# Patient Record
Sex: FEMALE | HISPANIC OR LATINO | Employment: UNEMPLOYED | URBAN - METROPOLITAN AREA
[De-identification: names, ages, dates, MRNs, and addresses within clinical notes are randomized per-mention and may not be internally consistent; named-entity substitution may affect disease eponyms.]

---

## 2021-05-28 LAB
EXTERNAL ABO GROUPING: NORMAL
EXTERNAL HEPATITIS B SURFACE ANTIGEN: NEGATIVE
EXTERNAL HEPATITIS C, RNA QUANT PCR: NORMAL
EXTERNAL RH FACTOR: POSITIVE
EXTERNAL RUBELLA QUALITATIVE: NORMAL
EXTERNAL SYPHILIS RPR SCREEN: NORMAL
HIV1 P24 AG SERPL QL IA: NORMAL

## 2021-12-23 LAB — EXTERNAL GROUP B STREP ANTIGEN: NORMAL

## 2022-01-01 ENCOUNTER — HOSPITAL ENCOUNTER (EMERGENCY)
Facility: HOSPITAL | Age: 32
Discharge: HOME | End: 2022-01-01
Attending: OBSTETRICS & GYNECOLOGY | Admitting: OBSTETRICS & GYNECOLOGY

## 2022-01-01 VITALS
HEART RATE: 82 BPM | OXYGEN SATURATION: 99 % | HEIGHT: 61 IN | TEMPERATURE: 98 F | RESPIRATION RATE: 16 BRPM | DIASTOLIC BLOOD PRESSURE: 80 MMHG | SYSTOLIC BLOOD PRESSURE: 121 MMHG

## 2022-01-01 PROCEDURE — 99283 EMERGENCY DEPT VISIT LOW MDM: CPT | Performed by: OBSTETRICS & GYNECOLOGY

## 2022-01-01 PROCEDURE — 59025 FETAL NON-STRESS TEST: CPT

## 2022-01-01 RX ORDER — FERROUS SULFATE 325(65) MG
325 TABLET ORAL
COMMUNITY

## 2022-01-01 RX ORDER — FOLIC ACID 1 MG/1
1 TABLET ORAL DAILY
COMMUNITY
End: 2022-01-10 | Stop reason: HOSPADM

## 2022-01-01 RX ORDER — PRENATAL VIT NO.126/IRON/FOLIC 28MG-0.8MG
1 TABLET ORAL DAILY
COMMUNITY

## 2022-01-01 NOTE — NURSING NOTE
D/C papers given and explained. To call MD with any vag bleeding, LOF, decr FM or ctx. To keep next appt. Pt verbalized understanding. Ambulatory out the door. Plans on scheduled rpt cs next weekend.  Ferny Lopez RN

## 2022-01-01 NOTE — NON STRESS TEST
Harriet Vale, a  at 38w0d with an LOUIS of 1/15/2022, by Patient Reported, was seen at [facility] OBSTETRIC EMERGENCY DEPARTMENT for a nonstress test.    Chief Complaint   Patient presents with   • Vaginal Bleeding     Pt reports dark red bleeding starting 2 hours ago (pt noted like period blood). Pt and  stated that they had sex this morning. Pt reports +FM, and occasional ctx, denies LOF.        There is no problem list on file for this patient.      Start Time:   Stop Time:     Interpretation A  Nonstress Test Interpretation A: Reactive (22 : Mali Valverde, RN)

## 2022-01-01 NOTE — OBED NOTES
NOELLE Note OB        Patient Name: Harriet Vale  YOB: 1990  MRN: [id number]  Admission Date: 2022  4:31 PM  Date of Service: 2022    Chief Complaint: Vaginal Bleeding (Pt reports dark red bleeding starting 2 hours ago (pt noted like period blood). Pt and  stated that they had sex this morning. Pt reports +FM, and occasional ctx, denies LOF. )        Subjective     Harriet Vale is a 31 y.o. female  at 38w0d with Estimated Date of Delivery: 1/15/22 who presents with the chief complaint listed above.  She sees Jigna Carver MD for her prenatal care. Her pregnancy has been complicated by:  anemia.    Patient with small amount of bleeding that started after intercourse earlier today.  patient reports dark red bleeding that has decreased since starting.  She denies abdominal pain or regular contractions.  She denies abdominal trauma.  She states her blood type is O positive.      She describes fetal movement as normal.  She denies rupture of membranes.  She has vaginal bleeding. She is not feeling contractions.          Objective   There are no problems to display for this patient.       OB History    Para Term  AB Living   1 0 0 0 0 0   SAB IAB Ectopic Molar Multiple Live Births   0 0 0 0 0 0      # Outcome Date GA Lbr Ronald/2nd Weight Sex Delivery Anes PTL Lv   1 Current                 No past medical history on file.    Past Surgical History:   Procedure Laterality Date   •  SECTION         No current facility-administered medications on file prior to encounter.     Current Outpatient Medications on File Prior to Encounter   Medication Sig Dispense Refill   • ferrous sulfate 325 (65 FE) MG tablet Take 325 mg by mouth Daily With Breakfast.     • folic acid (FOLVITE) 1 MG tablet Take 1 mg by mouth Daily.     • prenatal vitamin (prenatal, CLASSIC, vitamin) tablet Take 1 tablet by mouth Daily.         No Known Allergies    No  "family history on file.    Social History     Socioeconomic History   • Marital status:      Spouse name: Parveen   • Number of children: 1   Tobacco Use   • Smoking status: Never Smoker   Substance and Sexual Activity   • Alcohol use: Not Currently   • Drug use: Not Currently           Review of Systems   Constitutional: Negative for chills, fatigue and fever.   HENT: Negative for congestion, rhinorrhea and sore throat.    Eyes: Negative for visual disturbance.   Respiratory: Negative.    Cardiovascular: Negative.    Gastrointestinal: Negative for abdominal pain, constipation, diarrhea, nausea and vomiting.   Genitourinary: Positive for vaginal bleeding. Negative for difficulty urinating, dyspareunia, dysuria, flank pain, frequency, genital sores, hematuria, pelvic pain, urgency, vaginal discharge and vaginal pain.   Neurological: Negative for dizziness, seizures, light-headedness and headaches.   Psychiatric/Behavioral: Negative for sleep disturbance. The patient is not nervous/anxious.           PHYSICAL EXAM:      VITAL SIGNS:  Vitals:    01/01/22 1649 01/01/22 1650 01/01/22 1655   BP: 121/80     BP Location: Right arm     Patient Position: Sitting     Pulse: 75 80 80   Resp: 16     Temp: 98 °F (36.7 °C)     TempSrc: Oral     SpO2: 100% 100% 99%   Height: 156 cm (61.42\")          FHT'S:                   Baseline:  130 BPM  Variability:  Moderate = 6 - 25 BPM  Accelerations:  15 x 15 accelerations present     Decelerations:  absent  Contractions:   present     Interpretation:    Reactive NST, CAT 1 tracing        PHYSICAL EXAM:    General: well developed; well nourished  no acute distress   Heart: Not performed.   Lungs  : breathing is unlabored     Abdomen: soft, non-tender; no masses  no umbilical or inguinal hernias are present  no hepato-splenomegaly       Cervix: was checked (by me): 0 cm / 1 % / -3 , small amount of dark red bloody show on glove      Contractions: irregular        Extremities: " "peripheral pulses normal, no pedal edema, no clubbing or cyanosis      LABS AND TESTING ORDERED:  1. Uterine and fetal monitoring      LAB RESULTS:    No results found for this or any previous visit (from the past 24 hour(s)).    No results found for: ABO, RH    No results found for: STREPGPB              Assessment/Plan     ASSESSMENT/PLAN:  Harriet Vale is a 31 y.o. female  at 38w0d who presented with: small amount of bleeding after intercourse.  No active bleeding seen on exam with reassuring fetal status by NST.  Patient with closed cervix and irregular contractions that she is not feeling.  Patient likely with small amount of bleeding from intercourse due to friable cervix or cervical thinning due to advanced gestation.  She was reassured.  She was discharged home with return precautions given for increase in bleeding, pain, regular contractions, or decreased fetal movement.          Final Impression:  • Pregnancy at 38w0d  • Reactive NST.  CAT 1 tracing  • Vaginal bleeding after intercourse in pregnancy  • Maternal vital signs were reviewed and were unremarkable              Vitals:    22 1649 22 1650 22 1655   BP: 121/80     BP Location: Right arm     Patient Position: Sitting     Pulse: 75 80 80   Resp: 16     Temp: 98 °F (36.7 °C)     TempSrc: Oral     SpO2: 100% 100% 99%   Height: 156 cm (61.42\")     •     • No results found for: STREPGPB  • No results found for: ABO, RH  • COVID - 19 status unknown      PLAN:       I have spent 30 minutes including face to face time with the patient, greater than 50% in discussion of the diagnosis (counseling) and/or coordination of care.     Yadi Rivera MD  2022  17:15 EST  OB Hospitalist  Phone:  [phone]  "

## 2022-01-01 NOTE — DISCHARGE INSTRUCTIONS
Discharged to home. Encouraged to keep next OB appointment. Return to Labor and Delivery if contractions are less than 5 minutes apart and are getting stronger in intensity, leaking of fluid or vaginal bleeding.

## 2022-01-08 ENCOUNTER — ANESTHESIA EVENT (OUTPATIENT)
Dept: LABOR AND DELIVERY | Facility: HOSPITAL | Age: 32
End: 2022-01-08

## 2022-01-08 ENCOUNTER — HOSPITAL ENCOUNTER (INPATIENT)
Facility: HOSPITAL | Age: 32
LOS: 2 days | Discharge: HOME | End: 2022-01-10
Attending: OBSTETRICS & GYNECOLOGY | Admitting: OBSTETRICS & GYNECOLOGY

## 2022-01-08 ENCOUNTER — ANESTHESIA (OUTPATIENT)
Dept: LABOR AND DELIVERY | Facility: HOSPITAL | Age: 32
End: 2022-01-08

## 2022-01-08 PROBLEM — Z34.90 PREGNANCY: Status: ACTIVE | Noted: 2022-01-08

## 2022-01-08 LAB
ABO GROUP BLD: NORMAL
BLD GP AB SCN SERPL QL: NEGATIVE
DEPRECATED RDW RBC AUTO: 50.4 FL (ref 37–54)
ERYTHROCYTE [DISTWIDTH] IN BLOOD BY AUTOMATED COUNT: 15.1 % (ref 12.3–15.4)
HCT VFR BLD AUTO: 37 % (ref 34–46.6)
HGB BLD-MCNC: 12.5 G/DL (ref 12–15.9)
MCH RBC QN AUTO: 30.9 PG (ref 26.6–33)
MCHC RBC AUTO-ENTMCNC: 33.8 G/DL (ref 31.5–35.7)
MCV RBC AUTO: 91.6 FL (ref 79–97)
PLATELET # BLD AUTO: 211 10*3/MM3 (ref 140–450)
PMV BLD AUTO: 10.8 FL (ref 6–12)
RBC # BLD AUTO: 4.04 10*6/MM3 (ref 3.77–5.28)
RH BLD: POSITIVE
SARS-COV-2 RNA PNL SPEC NAA+PROBE: NOT DETECTED
T&S EXPIRATION DATE: NORMAL
WBC NRBC COR # BLD: 9.46 10*3/MM3 (ref 3.4–10.8)

## 2022-01-08 PROCEDURE — 25010000002 MORPHINE PER 10 MG: Performed by: ANESTHESIOLOGY

## 2022-01-08 PROCEDURE — 0 CEFAZOLIN IN DEXTROSE 2-4 GM/100ML-% SOLUTION: Performed by: OBSTETRICS & GYNECOLOGY

## 2022-01-08 PROCEDURE — 85027 COMPLETE CBC AUTOMATED: CPT | Performed by: OBSTETRICS & GYNECOLOGY

## 2022-01-08 PROCEDURE — 25010000002 ONDANSETRON PER 1 MG: Performed by: ANESTHESIOLOGY

## 2022-01-08 PROCEDURE — 63710000001 DIPHENHYDRAMINE PER 50 MG: Performed by: ANESTHESIOLOGY

## 2022-01-08 PROCEDURE — 25010000002 PHENYLEPHRINE 10 MG/ML SOLUTION: Performed by: ANESTHESIOLOGY

## 2022-01-08 PROCEDURE — 87635 SARS-COV-2 COVID-19 AMP PRB: CPT | Performed by: OBSTETRICS & GYNECOLOGY

## 2022-01-08 PROCEDURE — 86901 BLOOD TYPING SEROLOGIC RH(D): CPT | Performed by: OBSTETRICS & GYNECOLOGY

## 2022-01-08 PROCEDURE — 88307 TISSUE EXAM BY PATHOLOGIST: CPT

## 2022-01-08 PROCEDURE — 86900 BLOOD TYPING SEROLOGIC ABO: CPT | Performed by: OBSTETRICS & GYNECOLOGY

## 2022-01-08 PROCEDURE — 86850 RBC ANTIBODY SCREEN: CPT | Performed by: OBSTETRICS & GYNECOLOGY

## 2022-01-08 RX ORDER — IBUPROFEN 600 MG/1
600 TABLET ORAL EVERY 8 HOURS PRN
Status: DISCONTINUED | OUTPATIENT
Start: 2022-01-08 | End: 2022-01-10 | Stop reason: HOSPADM

## 2022-01-08 RX ORDER — ONDANSETRON 2 MG/ML
4 INJECTION INTRAMUSCULAR; INTRAVENOUS ONCE AS NEEDED
Status: DISCONTINUED | OUTPATIENT
Start: 2022-01-08 | End: 2022-01-10 | Stop reason: HOSPADM

## 2022-01-08 RX ORDER — OXYTOCIN-SODIUM CHLORIDE 0.9% IV SOLN 30 UNIT/500ML 30-0.9/5 UT/ML-%
125 SOLUTION INTRAVENOUS CONTINUOUS PRN
Status: COMPLETED | OUTPATIENT
Start: 2022-01-08 | End: 2022-01-08

## 2022-01-08 RX ORDER — SODIUM CHLORIDE 0.9 % (FLUSH) 0.9 %
10 SYRINGE (ML) INJECTION AS NEEDED
Status: DISCONTINUED | OUTPATIENT
Start: 2022-01-08 | End: 2022-01-08 | Stop reason: HOSPADM

## 2022-01-08 RX ORDER — PHYTONADIONE 1 MG/.5ML
INJECTION, EMULSION INTRAMUSCULAR; INTRAVENOUS; SUBCUTANEOUS
Status: ACTIVE
Start: 2022-01-08 | End: 2022-01-08

## 2022-01-08 RX ORDER — FAMOTIDINE 10 MG/ML
20 INJECTION, SOLUTION INTRAVENOUS ONCE AS NEEDED
Status: COMPLETED | OUTPATIENT
Start: 2022-01-08 | End: 2022-01-08

## 2022-01-08 RX ORDER — SODIUM CHLORIDE, SODIUM LACTATE, POTASSIUM CHLORIDE, CALCIUM CHLORIDE 600; 310; 30; 20 MG/100ML; MG/100ML; MG/100ML; MG/100ML
125 INJECTION, SOLUTION INTRAVENOUS CONTINUOUS
Status: DISCONTINUED | OUTPATIENT
Start: 2022-01-08 | End: 2022-01-08

## 2022-01-08 RX ORDER — DIPHENHYDRAMINE HCL 25 MG
25 CAPSULE ORAL EVERY 4 HOURS PRN
Status: DISCONTINUED | OUTPATIENT
Start: 2022-01-08 | End: 2022-01-10 | Stop reason: HOSPADM

## 2022-01-08 RX ORDER — OXYTOCIN-SODIUM CHLORIDE 0.9% IV SOLN 30 UNIT/500ML 30-0.9/5 UT/ML-%
250 SOLUTION INTRAVENOUS CONTINUOUS PRN
Status: ACTIVE | OUTPATIENT
Start: 2022-01-08 | End: 2022-01-08

## 2022-01-08 RX ORDER — SODIUM CHLORIDE 0.9 % (FLUSH) 0.9 %
10 SYRINGE (ML) INJECTION EVERY 12 HOURS SCHEDULED
Status: DISCONTINUED | OUTPATIENT
Start: 2022-01-08 | End: 2022-01-08 | Stop reason: HOSPADM

## 2022-01-08 RX ORDER — OXYTOCIN-SODIUM CHLORIDE 0.9% IV SOLN 30 UNIT/500ML 30-0.9/5 UT/ML-%
999 SOLUTION INTRAVENOUS ONCE
Status: COMPLETED | OUTPATIENT
Start: 2022-01-08 | End: 2022-01-08

## 2022-01-08 RX ORDER — PHENYLEPHRINE HYDROCHLORIDE 10 MG/ML
INJECTION INTRAVENOUS AS NEEDED
Status: DISCONTINUED | OUTPATIENT
Start: 2022-01-08 | End: 2022-01-08 | Stop reason: SURG

## 2022-01-08 RX ORDER — OXYCODONE HYDROCHLORIDE AND ACETAMINOPHEN 5; 325 MG/1; MG/1
1 TABLET ORAL EVERY 4 HOURS PRN
Status: DISCONTINUED | OUTPATIENT
Start: 2022-01-08 | End: 2022-01-10 | Stop reason: HOSPADM

## 2022-01-08 RX ORDER — HYDROMORPHONE HYDROCHLORIDE 1 MG/ML
0.25 INJECTION, SOLUTION INTRAMUSCULAR; INTRAVENOUS; SUBCUTANEOUS
Status: ACTIVE | OUTPATIENT
Start: 2022-01-08 | End: 2022-01-09

## 2022-01-08 RX ORDER — MORPHINE SULFATE 2 MG/ML
2 INJECTION, SOLUTION INTRAMUSCULAR; INTRAVENOUS
Status: ACTIVE | OUTPATIENT
Start: 2022-01-08 | End: 2022-01-09

## 2022-01-08 RX ORDER — DIPHENHYDRAMINE HYDROCHLORIDE 50 MG/ML
25 INJECTION INTRAMUSCULAR; INTRAVENOUS EVERY 4 HOURS PRN
Status: DISCONTINUED | OUTPATIENT
Start: 2022-01-08 | End: 2022-01-10 | Stop reason: HOSPADM

## 2022-01-08 RX ORDER — EPHEDRINE SULFATE 50 MG/ML
INJECTION, SOLUTION INTRAVENOUS AS NEEDED
Status: DISCONTINUED | OUTPATIENT
Start: 2022-01-08 | End: 2022-01-08 | Stop reason: SURG

## 2022-01-08 RX ORDER — SIMETHICONE 80 MG
80 TABLET,CHEWABLE ORAL 4 TIMES DAILY PRN
Status: DISCONTINUED | OUTPATIENT
Start: 2022-01-08 | End: 2022-01-10 | Stop reason: HOSPADM

## 2022-01-08 RX ORDER — ERYTHROMYCIN 5 MG/G
OINTMENT OPHTHALMIC
Status: ACTIVE
Start: 2022-01-08 | End: 2022-01-08

## 2022-01-08 RX ORDER — MORPHINE SULFATE 1 MG/ML
INJECTION, SOLUTION EPIDURAL; INTRATHECAL; INTRAVENOUS
Status: COMPLETED | OUTPATIENT
Start: 2022-01-08 | End: 2022-01-08

## 2022-01-08 RX ORDER — MISOPROSTOL 200 UG/1
800 TABLET ORAL ONCE AS NEEDED
Status: DISCONTINUED | OUTPATIENT
Start: 2022-01-08 | End: 2022-01-08 | Stop reason: HOSPADM

## 2022-01-08 RX ORDER — BUPIVACAINE HYDROCHLORIDE 7.5 MG/ML
INJECTION, SOLUTION EPIDURAL; RETROBULBAR
Status: COMPLETED | OUTPATIENT
Start: 2022-01-08 | End: 2022-01-08

## 2022-01-08 RX ORDER — ACETAMINOPHEN 500 MG
1000 TABLET ORAL ONCE
Status: COMPLETED | OUTPATIENT
Start: 2022-01-08 | End: 2022-01-08

## 2022-01-08 RX ORDER — ONDANSETRON 2 MG/ML
4 INJECTION INTRAMUSCULAR; INTRAVENOUS ONCE AS NEEDED
Status: COMPLETED | OUTPATIENT
Start: 2022-01-08 | End: 2022-01-08

## 2022-01-08 RX ORDER — KETOROLAC TROMETHAMINE 15 MG/ML
15 INJECTION, SOLUTION INTRAMUSCULAR; INTRAVENOUS EVERY 6 HOURS PRN
Status: ACTIVE | OUTPATIENT
Start: 2022-01-08 | End: 2022-01-09

## 2022-01-08 RX ORDER — ONDANSETRON 4 MG/1
4 TABLET, FILM COATED ORAL EVERY 8 HOURS PRN
Status: DISCONTINUED | OUTPATIENT
Start: 2022-01-08 | End: 2022-01-10 | Stop reason: HOSPADM

## 2022-01-08 RX ORDER — SODIUM CHLORIDE, SODIUM LACTATE, POTASSIUM CHLORIDE, CALCIUM CHLORIDE 600; 310; 30; 20 MG/100ML; MG/100ML; MG/100ML; MG/100ML
125 INJECTION, SOLUTION INTRAVENOUS CONTINUOUS
Status: DISCONTINUED | OUTPATIENT
Start: 2022-01-08 | End: 2022-01-10 | Stop reason: HOSPADM

## 2022-01-08 RX ORDER — CARBOPROST TROMETHAMINE 250 UG/ML
250 INJECTION, SOLUTION INTRAMUSCULAR
Status: DISCONTINUED | OUTPATIENT
Start: 2022-01-08 | End: 2022-01-08 | Stop reason: HOSPADM

## 2022-01-08 RX ORDER — CEFAZOLIN SODIUM 2 G/100ML
2 INJECTION, SOLUTION INTRAVENOUS ONCE
Status: COMPLETED | OUTPATIENT
Start: 2022-01-08 | End: 2022-01-08

## 2022-01-08 RX ORDER — METHYLERGONOVINE MALEATE 0.2 MG/ML
200 INJECTION INTRAVENOUS ONCE AS NEEDED
Status: DISCONTINUED | OUTPATIENT
Start: 2022-01-08 | End: 2022-01-08 | Stop reason: HOSPADM

## 2022-01-08 RX ADMIN — MORPHINE SULFATE 200 MCG: 1 INJECTION, SOLUTION EPIDURAL; INTRATHECAL; INTRAVENOUS at 09:55

## 2022-01-08 RX ADMIN — SODIUM CHLORIDE, POTASSIUM CHLORIDE, SODIUM LACTATE AND CALCIUM CHLORIDE 1000 ML: 600; 310; 30; 20 INJECTION, SOLUTION INTRAVENOUS at 06:38

## 2022-01-08 RX ADMIN — PHENYLEPHRINE HYDROCHLORIDE 200 MCG: 10 INJECTION INTRAVENOUS at 10:28

## 2022-01-08 RX ADMIN — OXYTOCIN-SODIUM CHLORIDE 0.9% IV SOLN 30 UNIT/500ML 999 ML/HR: 30-0.9/5 SOLUTION at 10:11

## 2022-01-08 RX ADMIN — ACETAMINOPHEN 1000 MG: 500 TABLET ORAL at 09:25

## 2022-01-08 RX ADMIN — EPHEDRINE SULFATE 5 MG: 50 INJECTION INTRAVENOUS at 09:59

## 2022-01-08 RX ADMIN — DIPHENHYDRAMINE HYDROCHLORIDE 25 MG: 25 CAPSULE ORAL at 16:31

## 2022-01-08 RX ADMIN — BUPIVACAINE HYDROCHLORIDE 1.6 ML: 7.5 INJECTION, SOLUTION EPIDURAL; RETROBULBAR at 09:55

## 2022-01-08 RX ADMIN — PHENYLEPHRINE HYDROCHLORIDE 100 MCG: 10 INJECTION INTRAVENOUS at 10:00

## 2022-01-08 RX ADMIN — SODIUM CHLORIDE, POTASSIUM CHLORIDE, SODIUM LACTATE AND CALCIUM CHLORIDE 125 ML/HR: 600; 310; 30; 20 INJECTION, SOLUTION INTRAVENOUS at 07:33

## 2022-01-08 RX ADMIN — CEFAZOLIN SODIUM 2 G: 2 INJECTION, SOLUTION INTRAVENOUS at 09:40

## 2022-01-08 RX ADMIN — OXYTOCIN 125 ML/HR: 10 INJECTION INTRAVENOUS at 11:35

## 2022-01-08 RX ADMIN — FAMOTIDINE 20 MG: 10 INJECTION INTRAVENOUS at 09:29

## 2022-01-08 RX ADMIN — ONDANSETRON 4 MG: 2 INJECTION INTRAMUSCULAR; INTRAVENOUS at 09:27

## 2022-01-08 NOTE — LACTATION NOTE
This note was copied from a baby's chart.  P2T. Mother reports infant did latch in recovery, but left side felt a little painful. Assisted mother with cross cradle hold, mother reports no pain, nipple round and intact upon release, strong suck noted with good jaw rotation. Switched to left side, mother latches in cross cradle and reports slight pinch, nipple slightly creased on sides with latch broken. Discussed deep latch and ways to achieve, mother then able to latch infant with a more asymmetrical angle, reports more comfortable latch. Also practiced with football hold, mother able to achieve more comfortable latch in this position as well.     Infant does have a stretchy anterior lingual frenulum. This was pointed out to parents, advised to consult pediatrician. Advised that if no pain with latching or transfer issues noted, this may not be an issue; but for ongoing latch pain or evidence of impaired transfer, frenulum could be causing issue.     Discussed feeding every 2-3 hours and PRN 10-15 min per side, how to know baby is getting enough, hand expression and when to expect mature milk to come in. Mother has a personal pump. Encouraged to call as needed for assistance

## 2022-01-08 NOTE — L&D DELIVERY NOTE
[facility]   Section Operative Note    Pre-Operative Dx:   1.  IUP at 39w0d weeks   2. Prior C/S         Postoperative Dx:  Same/ Uterine window/         Procedure: , Low Transverse / Banjo curettage   Surgeon: Jigna Carver     Assistant: Dr Quiñones   Anesthesia: Spinal    EBL: 433 ml               Findings:                           Amniotic Fluid clear  Uterus normal  Tubes and ovaries normal bilaterally              Infant:           Gender: Viable female  infant     Weight: 2820 g (6 lb 3.5 oz)     Apgars: 8  @ 1 minute /     9  @ 5 minutes                 Indication for C/Section:     Prior C/S               Procedure Details:  The patient was taken to the operating room where spinal anesthesia was found to be adequate. She was prepped and draped in the usual sterile manner in the dorsal supine position with leftward tilt. A Pfannenstiel incision was made and carried down to the fascia. The fascia was incised in the mid-line and extended transversely with Agee scissors. The fascia was grasped and elevated and  from the underlying rectus muscles superiorly and inferiorly. The peritoneum was identified and entered. Peritoneal incision was extended superiorly and inferiorly with good visualization of the bladder. The bladder blade was inserted and the vesicouterine peritoneum was incised transversely and the bladder flap was created digitally. The bladder blade was reinserted. The  lower uterine segment was incised in a transverse fashion.  The head was elevated and delivered through the incision. The remainder of the infant was delivered without difficulty. The umbilical cord was clamped and cut and the infant was handed off the field. Cord ph and cord bloods were obtained. The placenta was removed intact and appeared normal. Retained placenta noted/ Banjo curettage done/  The uterine incision was closed with running locked sutures of 0 monocryl. The abdominal cavity was  irrigated and cleared of all clot and debris. The uterine incision was inspected and noted to be hemostatic. Rectus muscles were reapproximated in the midline with 0 chromic.  The fascia was closed with 0 PDS in running continuous fashion. The subcutaneous tissue was closed with 3-0 chromic. The skin was closed in subcuticular fashion with 4-0 Monocryl.   Instrument, sponge, and needle counts were correct prior the abdominal closure and at the conclusion of the case. Mother and baby  to recovery room in stable condition.              Complications:     None          Antibiotics: cefazolin (Ancef)    NOTE/ Due to Uterine Window noted rec to have CD @ 37wks with next pregancy/                       Jigna Carver MD  1/8/2022  10:43 EST

## 2022-01-08 NOTE — PLAN OF CARE
Problem: Adult Inpatient Plan of Care  Goal: Plan of Care Review  2022 by Loreto Jones RN  Outcome: Ongoing, Progressing  Flowsheets (Taken 2022)  Progress: improving  Plan of Care Reviewed With:   patient   spouse  Outcome Summary: C/S delivery at 1009. Patient is currently in JONNY with infant breastfeeding. Patient spouse is at bedside attentive to both patient and infant. Patient rating pain 0/10 and fundus is firm, midline, at the U. RN to continue to monitor patient and infant in the recovery period before transfer to postpartum unit.  2022 by Loreto Jones RN  Outcome: Ongoing, Progressing  Flowsheets (Taken 2022)  Progress: improving  Plan of Care Reviewed With:   patient   spouse  Outcome Summary: C/S delivery at 1009. Patient is currently in JONNY with infant breastfeeding. Patient spouse is at bedside attentive to both patient and infant. Patient rating pain 0/10 and fundus is firm, midline, at the U. RN to continue to monitor patient and infant in the recovery period before transfer to postpartum unit.  Goal: Patient-Specific Goal (Individualized)  2022 by Loreto Jones RN  Outcome: Ongoing, Progressing  Flowsheets (Taken 2022 110)  Patient-Specific Goals (Include Timeframe): Healthy mom and baby by discharge  Individualized Care Needs: Amanda FULLER Kepp patient and spouse informed of plan of care  Anxieties, Fears or Concerns: Wants to make sure  is healthy  2022 by Loreto Jones RN  Outcome: Ongoing, Progressing  Flowsheets (Taken 2022 110)  Patient-Specific Goals (Include Timeframe): Healthy mom and baby by discharge  Individualized Care Needs: Amanda FULLER Kepp patient and spouse informed of plan of care  Anxieties, Fears or Concerns: Wants to make sure  is healthy  Goal: Absence of Hospital-Acquired Illness or Injury  2022 by Loreto Jones RN  Outcome: Ongoing, Progressing  2022 by Karen  FENG Dangelo  Outcome: Ongoing, Progressing  Intervention: Identify and Manage Fall Risk  Recent Flowsheet Documentation  Taken 1/8/2022 1100 by Loreto Jones RN  Safety Promotion/Fall Prevention: safety round/check completed  Taken 1/8/2022 0711 by Loreto Jones RN  Safety Promotion/Fall Prevention: safety round/check completed  Intervention: Prevent Skin Injury  Recent Flowsheet Documentation  Taken 1/8/2022 1100 by Loreto Jones RN  Body Position: sitting up in bed  Taken 1/8/2022 1051 by Loreto Jones RN  Body Position: sitting up in bed  Taken 1/8/2022 0711 by Loreto Jones RN  Body Position: sitting up in bed  Intervention: Prevent and Manage VTE (venous thromboembolism) Risk  Recent Flowsheet Documentation  Taken 1/8/2022 1100 by Loreto Jones RN  VTE Prevention/Management:   bilateral   sequential compression devices on  Taken 1/8/2022 1051 by Loreto Jones RN  VTE Prevention/Management:   bilateral   sequential compression devices on  Goal: Optimal Comfort and Wellbeing  1/8/2022 1108 by Loreto Jones RN  Outcome: Ongoing, Progressing  1/8/2022 1108 by Loreto Jones RN  Outcome: Ongoing, Progressing  Intervention: Provide Person-Centered Care  Recent Flowsheet Documentation  Taken 1/8/2022 1051 by Loreto Jones RN  Trust Relationship/Rapport:   care explained   choices provided  Taken 1/8/2022 0711 by Loreto Jones RN  Trust Relationship/Rapport:   care explained   choices provided   questions answered   questions encouraged   thoughts/feelings acknowledged  Goal: Readiness for Transition of Care  1/8/2022 1108 by Loreto Jones RN  Outcome: Ongoing, Progressing  1/8/2022 1108 by Loreto Jones RN  Outcome: Ongoing, Progressing   Goal Outcome Evaluation:  Plan of Care Reviewed With: patient, spouse        Progress: improving  Outcome Summary: C/S delivery at 1009. Patient is currently in JONNY with infant breastfeeding. Patient spouse is at bedside attentive to both patient and infant. Patient rating  pain 0/10 and fundus is firm, midline, at the U. RN to continue to monitor patient and infant in the recovery period before transfer to postpartum unit.

## 2022-01-08 NOTE — ANESTHESIA PREPROCEDURE EVALUATION
Anesthesia Evaluation     Patient summary reviewed and Nursing notes reviewed                Airway   Mallampati: II  TM distance: >3 FB  Neck ROM: full  Dental - normal exam     Pulmonary - negative pulmonary ROS and normal exam    breath sounds clear to auscultation  Cardiovascular - negative cardio ROS and normal exam    Rhythm: regular  Rate: normal    (-) angina, orthopnea, PND, BATISTA      Neuro/Psych- negative ROS  GI/Hepatic/Renal/Endo - negative ROS     Musculoskeletal (-) negative ROS    Abdominal    Substance History - negative use     OB/GYN    (+) Pregnant,         Other - negative ROS                       Anesthesia Plan    ASA 2     spinal       Anesthetic plan, all risks, benefits, and alternatives have been provided, discussed and informed consent has been obtained with: patient.

## 2022-01-08 NOTE — ANESTHESIA POSTPROCEDURE EVALUATION
"Patient: Harriet Vale    Procedure Summary     Date: 22 Room / Location:  [facility] /  [facility]    Anesthesia Start: 944 Anesthesia Stop:     Procedure:  SECTION REPEAT (N/A Abdomen) Diagnosis:     Surgeons: Jigna Carver MD Provider: Nigel Chavez MD    Anesthesia Type: spinal ASA Status: 2          Anesthesia Type: spinal    Vitals  Vitals Value Taken Time   /73 22 1200   Temp 36.3 °C (97.4 °F) 22 1051   Pulse 65 22 1200   Resp 16 22 1146   SpO2 100 % 22 1200   Vitals shown include unvalidated device data.        Post Anesthesia Care and Evaluation    Patient location during evaluation: bedside  Patient participation: complete - patient participated  Level of consciousness: awake  Pain management: adequate  Airway patency: patent  Anesthetic complications: No anesthetic complications    Cardiovascular status: acceptable  Respiratory status: acceptable  Hydration status: acceptable    Comments: /77 (BP Location: Left arm, Patient Position: Sitting)   Pulse 75   Temp 36.3 °C (97.4 °F) (Oral)   Resp 16   Ht 156 cm (61.42\")   Wt 65.8 kg (145 lb)   SpO2 100%   Breastfeeding Yes   BMI 27.03 kg/m²         "

## 2022-01-08 NOTE — ANESTHESIA PROCEDURE NOTES
Spinal Block      Patient location during procedure: OR  Indication:procedure for pain  Performed By  Anesthesiologist: Nigel Chavze MD  Preanesthetic Checklist  Completed: patient identified, IV checked, site marked, risks and benefits discussed, surgical consent, monitors and equipment checked, pre-op evaluation and timeout performed  Spinal Block Prep:  Patient Position:sitting  Sterile Tech:cap, gloves and mask  Prep:Chloraprep  Patient Monitoring:blood pressure monitoring, continuous pulse oximetry and EKG  Spinal Block Procedure  Approach:right paramedian  Guidance:landmark technique  Location:L3-L4  Needle Type:Quincke  Needle Gauge:25  Placement of Spinal needle event:cerebrospinal fluid aspirated    Fluid Appearance:clear  Medications: bupivacaine PF (MARCAINE) 0.75 % injection, 1.6 mL  Morphine PF injection, 200 mcg  Med Administered at 1/8/2022 9:55 AM   Post Assessment  Patient Tolerance:patient tolerated the procedure well with no apparent complications  Complications no

## 2022-01-08 NOTE — H&P
H&P reviewed. The patient was examined and there are no changes to the H&P.    Jigna Carver MD  1/8/2022  09:52 EST

## 2022-01-09 LAB
BASOPHILS # BLD AUTO: 0.03 10*3/MM3 (ref 0–0.2)
BASOPHILS NFR BLD AUTO: 0.3 % (ref 0–1.5)
DEPRECATED RDW RBC AUTO: 53.5 FL (ref 37–54)
EOSINOPHIL # BLD AUTO: 0.04 10*3/MM3 (ref 0–0.4)
EOSINOPHIL NFR BLD AUTO: 0.3 % (ref 0.3–6.2)
ERYTHROCYTE [DISTWIDTH] IN BLOOD BY AUTOMATED COUNT: 15.4 % (ref 12.3–15.4)
HCT VFR BLD AUTO: 35.5 % (ref 34–46.6)
HGB BLD-MCNC: 11.7 G/DL (ref 12–15.9)
IMM GRANULOCYTES # BLD AUTO: 0.07 10*3/MM3 (ref 0–0.05)
IMM GRANULOCYTES NFR BLD AUTO: 0.6 % (ref 0–0.5)
LYMPHOCYTES # BLD AUTO: 1.7 10*3/MM3 (ref 0.7–3.1)
LYMPHOCYTES NFR BLD AUTO: 14.5 % (ref 19.6–45.3)
MCH RBC QN AUTO: 31.4 PG (ref 26.6–33)
MCHC RBC AUTO-ENTMCNC: 33 G/DL (ref 31.5–35.7)
MCV RBC AUTO: 95.2 FL (ref 79–97)
MONOCYTES # BLD AUTO: 0.83 10*3/MM3 (ref 0.1–0.9)
MONOCYTES NFR BLD AUTO: 7.1 % (ref 5–12)
NEUTROPHILS NFR BLD AUTO: 77.2 % (ref 42.7–76)
NEUTROPHILS NFR BLD AUTO: 9.08 10*3/MM3 (ref 1.7–7)
NRBC BLD AUTO-RTO: 0 /100 WBC (ref 0–0.2)
PLATELET # BLD AUTO: 185 10*3/MM3 (ref 140–450)
PMV BLD AUTO: 10.3 FL (ref 6–12)
RBC # BLD AUTO: 3.73 10*6/MM3 (ref 3.77–5.28)
WBC NRBC COR # BLD: 11.75 10*3/MM3 (ref 3.4–10.8)

## 2022-01-09 PROCEDURE — 85025 COMPLETE CBC W/AUTO DIFF WBC: CPT | Performed by: OBSTETRICS & GYNECOLOGY

## 2022-01-09 RX ORDER — DOCUSATE SODIUM 100 MG/1
100 CAPSULE, LIQUID FILLED ORAL 2 TIMES DAILY
Status: DISCONTINUED | OUTPATIENT
Start: 2022-01-09 | End: 2022-01-10 | Stop reason: HOSPADM

## 2022-01-09 RX ADMIN — IBUPROFEN 600 MG: 600 TABLET, FILM COATED ORAL at 03:23

## 2022-01-09 RX ADMIN — IBUPROFEN 600 MG: 600 TABLET, FILM COATED ORAL at 19:45

## 2022-01-09 NOTE — LACTATION NOTE
This note was copied from a baby's chart.  Mother reports more nipple pain today, pain bilaterally. No damage to nipples, encouraged lanolin use. Mother reports that infant was latching for 20-30 minutes  overnight, she did not hear much swallowing. She reports infant would then get fussy after that time and was still showing feeding cues. Infant weight loss was 6.5% at 12 hours. Infant has had good diapers.     Mother is easily able to latch infant in cross cradle hold, recommended hand expression throughout feed to help maximize transfer. Latch looks good and nipples round and intact upon release, but mother still reporting pain. There were swallows noted, but only a couple during the feed.     Provided hand pump but this was uncomfortable for mother. Assisted with hand expression and demonstrated how to efficiently express colostrum. Demonstrated personal pump use.    Encouraged mother to feed infant at the breast first every 2 hours during the day today every 3 hours overnight, 10-15 min per side, then pumping or hand expressing for 15 min and providing all colostrum obtained via syringe feed.     With mother's increasing nipple pain despite good latch and 6.5% weight loss for the first day, lactation advised parents to discuss tongue tie with pediatrician. There is a concern that this could be causing transfer issues that are leading to long feeds that are counter productive. Parents are very open to the idea of a revision if pediatrician recommends.     Encouraged to call as needed.

## 2022-01-09 NOTE — PROGRESS NOTES
[facility]   PROGRESS NOTE    Patient Name: Harriet Vale  :  1990  MRN:  [id number]      Post-Op Day 1 S/P    Delivered a female infant.  Subjective     Patient reports:    Pain is well controlled. Voiding and ambulating without difficulty.    Tolerating po. Lochia normal.       The patient plans to breastfeed.         Objective       Vitals: Vital Signs Range for the last 24 hours  Temperature: Temp:  [97.4 °F (36.3 °C)-98.7 °F (37.1 °C)] 98.1 °F (36.7 °C)   Temp Source: Temp src: Oral   BP: BP: ()/(63-76) 95/63   Pulse: Heart Rate:  [64-86] 86   Respirations: Resp:  [16-18] 18         Intake/Output Summary (Last 24 hours) at 2022 1228  Last data filed at 2022 1100  Gross per 24 hour   Intake 1089 ml   Output 4075 ml   Net -2986 ml                                              Physical Exam     General Alert and awake, in NAD      CV Regular rate and rhythm      Lungs clear to auscultation bilaterally        Abdomen Soft, non-distended, fundus firm,  2 FB below umbilicus, appropriately tender      Incision  Dressing clean, dry and intact.      Extremities  trace edema, calves NT               LABS: Results from last 7 days   Lab Units 22  0452 22  0619   WBC 10*3/mm3 11.75* 9.46   HEMOGLOBIN g/dL 11.7* 12.5   HEMATOCRIT % 35.5 37.0   PLATELETS 10*3/mm3 185 211         Prenatal labs results reviewed:  Yes   Rubella:  Immune   Rh Status:    RH type   Date Value Ref Range Status   2022 Positive  Final                       Assessment/Plan   : 1. POD 1 S/P C/S: Hemodynamically stable.  Doing well.  Continue routine care.       Pregnancy          Tony Olson MD  2022  12:28 EST

## 2022-01-09 NOTE — PLAN OF CARE
Problem: Adult Inpatient Plan of Care  Goal: Plan of Care Review  Outcome: Ongoing, Progressing  Flowsheets (Taken 1/9/2022 0642)  Progress: improving  Outcome Summary: VSS, dressing c/d/i, pain controlled with motrin, ambulated to restroom x3, velez cath removed-dtv, breastfeeding baby on demand   Goal Outcome Evaluation:           Progress: improving  Outcome Summary: VSS, dressing c/d/i, pain controlled with motrin, ambulated to restroom x3, velez cath removed-dtv, breastfeeding baby on demand

## 2022-01-10 VITALS
OXYGEN SATURATION: 100 % | BODY MASS INDEX: 27.38 KG/M2 | HEART RATE: 85 BPM | WEIGHT: 145 LBS | DIASTOLIC BLOOD PRESSURE: 71 MMHG | RESPIRATION RATE: 16 BRPM | TEMPERATURE: 97.5 F | HEIGHT: 61 IN | SYSTOLIC BLOOD PRESSURE: 103 MMHG

## 2022-01-10 RX ORDER — IBUPROFEN 600 MG/1
600 TABLET ORAL EVERY 8 HOURS PRN
Qty: 60 TABLET | Refills: 0 | Status: SHIPPED | OUTPATIENT
Start: 2022-01-10

## 2022-01-10 RX ORDER — OXYCODONE HYDROCHLORIDE AND ACETAMINOPHEN 5; 325 MG/1; MG/1
1 TABLET ORAL EVERY 4 HOURS PRN
Qty: 12 TABLET | Refills: 0 | Status: SHIPPED | OUTPATIENT
Start: 2022-01-10 | End: 2022-01-12

## 2022-01-10 RX ADMIN — DOCUSATE SODIUM 100 MG: 100 CAPSULE, LIQUID FILLED ORAL at 12:41

## 2022-01-10 RX ADMIN — IBUPROFEN 600 MG: 600 TABLET, FILM COATED ORAL at 12:41

## 2022-01-10 NOTE — LACTATION NOTE
Baby has tongue tie to tip of tongue. Baby may get frenectomy before d/c. Mom is using SNS while baby is BF but she reports sore nipples. No damage noted on either nipple at this time. Mom using lanolin. Mom pumped and got 1-2 ml's of breast milk, which she will give to baby. Encouraged to call LC as needed.    Lactation Consult Note    Evaluation Completed: 1/10/2022 08:18 EST  Patient Name: Harriet Vale  :  1990  MRN:  [id number]     REFERRAL  INFORMATION:                                         DELIVERY HISTORY:        Skin to skin initiation date/time: 2022  10:18 AM   Skin to skin end date/time: 2022  10:30 AM        MATERNAL ASSESSMENT:                               INFANT ASSESSMENT:  Information for the patient's :  Moe KavinMary bergeron [[id number]]   No past medical history on file.                                                                                                     MATERNAL INFANT FEEDING:                                                                       EQUIPMENT TYPE:                                 BREAST PUMPING:          LACTATION REFERRALS:

## 2022-01-10 NOTE — DISCHARGE SUMMARY
Date of Discharge:  1/10/2022    Discharge Diagnosis: repeat c/s    Presenting Problem/History of Present Illness  Pregnancy [Z34.90]       Hospital Course  Patient is a 31 y.o. female presented for scheduled repeat c/s.  Delivered viable female infant per Dr. Carver.  No pp complications.  Ready for d/c today- baby is waiting for ENT eval so if she is able to go ok for d/c today.      Procedures Performed  Procedure(s):   SECTION REPEAT         Condition on Discharge:  Post-Op Day 2 S/P   Subjective     Patient reports:    Doing well - ready for discharge. Pain well controlled. Tolerating po and   having flatus. Voiding and ambulating without difficulty. Lochia normal.     Vital Signs  Temp:  [98 °F (36.7 °C)-98.7 °F (37.1 °C)] 98 °F (36.7 °C)  Heart Rate:  [76-90] 87  Resp:  [16-18] 16  BP: ()/(63-73) 106/73    Physical Exam    Gen Alert and awake   Abdomen Soft, ND,  Fundus firm with minimal tenderness   Incision  Intact, without erythema or exudate   Extremities Calves NT bilaterally     Assessment/Plan ]   Assessment:  POD 2  -  Doing well. Stable for discharge.     Plan:    Instructions reviewed       Consults:   Consults     No orders found from 12/10/2021 to 2022.          Discharge Disposition  Home or Self Care    Discharge Medications     Discharge Medications      New Medications      Instructions Start Date   ibuprofen 600 MG tablet  Commonly known as: ADVIL,MOTRIN   600 mg, Oral, Every 8 Hours PRN      oxyCODONE-acetaminophen 5-325 MG per tablet  Commonly known as: PERCOCET   1 tablet, Oral, Every 4 Hours PRN         Continue These Medications      Instructions Start Date   ferrous sulfate 325 (65 FE) MG tablet   325 mg, Oral, Daily With Breakfast      prenatal (CLASSIC) vitamin  tablet  Generic drug: prenatal vitamin   1 tablet, Oral, Daily         Stop These Medications    folic acid 1 MG tablet  Commonly known as: FOLVITE            The patient has been prescribed a  controlled substance.  She has been counseled on the risks associated with using the medication.   The addictive potential of this medication and alternatives were discussed carefully with this patient and she demonstrated understanding.  A [facility] report has been obtained and reviewed.    Activity at Discharge: restrictions reviewed    Follow-up Appointments  No future appointments.      Test Results Pending at Discharge       RICHARD Torres  01/10/22  09:46 EST

## 2022-01-10 NOTE — LACTATION NOTE
This note was copied from a baby's chart.  Mom reports pain with breast feeding, no nipple damage. She is using SNS but baby is sleepy and at 10% weight loss now. Baby has tongue tie and to have frenectomy today. Mom would like to give baby formula bottle. Encouraged pumping every 2-3 hrs until baby is nursing well. Discussed [facility] and call for further assist.

## 2022-01-10 NOTE — PLAN OF CARE
Problem: Adult Inpatient Plan of Care  Goal: Plan of Care Review  Outcome: Ongoing, Progressing  Flowsheets  Taken 2022 by Funmilayo Middleton RN  Progress: improving  Outcome Summary: VSS, fundal assessments wnl, bleeding scant to light, pain well controlled, ambualting well, working on breastfeeding and SNS  Taken 2022 1108 by Loreto Jones RN  Plan of Care Reviewed With:   patient   spouse  Goal: Patient-Specific Goal (Individualized)  Outcome: Ongoing, Progressing  Goal: Absence of Hospital-Acquired Illness or Injury  Outcome: Ongoing, Progressing  Intervention: Identify and Manage Fall Risk  Recent Flowsheet Documentation  Taken 2022 by Funmilayo Middleton RN  Safety Promotion/Fall Prevention: safety round/check completed  Intervention: Prevent Skin Injury  Recent Flowsheet Documentation  Taken 2022 by Funmilayo Middleton RN  Body Position: position changed independently  Goal: Optimal Comfort and Wellbeing  Outcome: Ongoing, Progressing  Intervention: Provide Person-Centered Care  Recent Flowsheet Documentation  Taken 2022 by Funmilayo Middleton RN  Trust Relationship/Rapport: care explained  Goal: Readiness for Transition of Care  Outcome: Ongoing, Progressing     Problem: Adjustment to Role Transition (Postpartum  Delivery)  Goal: Successful Maternal Role Transition  Outcome: Ongoing, Progressing     Problem: Bleeding (Postpartum  Delivery)  Goal: Hemostasis  Outcome: Ongoing, Progressing     Problem: Infection (Postpartum  Delivery)  Goal: Absence of Infection Signs and Symptoms  Outcome: Ongoing, Progressing     Problem: Pain (Postpartum  Delivery)  Goal: Acceptable Pain Control  Outcome: Ongoing, Progressing  Intervention: Prevent or Manage Pain  Recent Flowsheet Documentation  Taken 2022 by Funmilayo Middleton RN  Pain Management Interventions:   see MAR   quiet environment facilitated   position adjusted   care clustered     Problem:  Postoperative Nausea and Vomiting (Postpartum  Delivery)  Goal: Nausea and Vomiting Relief  Outcome: Ongoing, Progressing     Problem: Postoperative Urinary Retention (Postpartum  Delivery)  Goal: Effective Urinary Elimination  Outcome: Ongoing, Progressing     Problem: Skin Injury Risk Increased  Goal: Skin Health and Integrity  Outcome: Ongoing, Progressing  Intervention: Optimize Skin Protection  Recent Flowsheet Documentation  Taken 2022 by Funmilayo Middleton RN  Head of Bed (HOB): HOB at 45 degrees     Problem: Fall Injury Risk  Goal: Absence of Fall and Fall-Related Injury  Outcome: Ongoing, Progressing  Intervention: Promote Injury-Free Environment  Recent Flowsheet Documentation  Taken 2022 by Funmilayo Middleton RN  Safety Promotion/Fall Prevention: safety round/check completed   Goal Outcome Evaluation:           Progress: improving  Outcome Summary: VSS, fundal assessments wnl, bleeding scant to light, pain well controlled, ambualting well, working on breastfeeding and SNS

## 2022-01-10 NOTE — LACTATION NOTE
This note was copied from a baby's chart.  Mother called for assist, she reports infant has been still showing hunger cues after feedings at the breast, latching for 1 hr at a time. She has been pumping, but has been able to get nothing for one pump, 0.8ml for another, and 0.4 ml for the most recent.     Discussed with mother that at this stage she may want to consider formula supplementation as infant is not being satisfied at the breast. Infant has been voiding well so has been getting some colostrum, but with last nights weight loss and infant behavior, it is apparent that infant is needing more volume. Mother is reluctant to start formula. She did agree with lactation that if weight loss at or near 10% she would begin supplement with an SNS.     When RN obtained weight, loss was over 10%. Mother agreeable to SNS, set up and explained use, infant immediately latched and easily took 10ml formula with SNS in place. Infant then began to relax and sleep on mother's chest.     Advised to continue feeds every 3 hours tonight, feeding at the breast, also providing formula via the SNS, and pumping after. Advised that if mother is not up for using the SNS every feeding, to do paced bottle feeding with slow flow nipple. If infant tolerates the 10ml well, they can increase next feeding to 15-20, increasing volume to 30ml by tomorrow.     Reinforced with mother that she will still continue to provide as much breastmilk as possible, continue to latch every feeding, and if mother's mature milk comes in with abundant supply that supplementation with formula will just be temporary.     Advised that lactation will be back tomorrow to check in on, but to notify RN tonight if any questions or concerns. Plan discussed with RN.

## 2022-03-13 ENCOUNTER — TELEPHONE (OUTPATIENT)
Dept: LACTATION | Facility: HOSPITAL | Age: 32
End: 2022-03-13

## 2022-03-13 NOTE — TELEPHONE ENCOUNTER
Mother left message with [facility], c/o pain in breast/nipple, started yesterday morning after baby slept more through the night. She denies redness or heat in the breast, no fever, nipple is reddened and appears to have some abrasions, no evidence of nipple blebs or plaques on nipple. She does have a hard lump on lateral side of breast. She recently used her haakaa and did an epsom salt soak for her nipple. Discussed using heat and massage to help clear duct, encouraged ice to help with discomfort, and encouraged nipple rest for a couple of days, feeding on one side, pumping on the affected side. Discussed signs of mastitis and advised to call OB for treatment if these signs occur. Encouraged to call [facility] and schedule an appt if this worsens, or does not improve in the next couple of days.

## 2024-07-30 ENCOUNTER — APPOINTMENT (OUTPATIENT)
Dept: WOMENS IMAGING | Facility: HOSPITAL | Age: 34
End: 2024-07-30
Payer: COMMERCIAL

## 2024-07-30 PROCEDURE — 77066 DX MAMMO INCL CAD BI: CPT | Performed by: RADIOLOGY

## 2024-07-30 PROCEDURE — 76642 ULTRASOUND BREAST LIMITED: CPT | Performed by: RADIOLOGY

## 2024-07-30 PROCEDURE — G0279 TOMOSYNTHESIS, MAMMO: HCPCS | Performed by: RADIOLOGY

## 2024-07-30 PROCEDURE — 77062 BREAST TOMOSYNTHESIS BI: CPT | Performed by: RADIOLOGY

## (undated) DEVICE — KENDALL SCD EXPRESS SLEEVES, KNEE LENGTH, MEDIUM: Brand: KENDALL SCD

## (undated) DEVICE — SUT PDS 0 CTX 36IN DYED Z370T

## (undated) DEVICE — SUT MNCRYL 0/0 CTX 36IN Y398H

## (undated) DEVICE — SUT GUT CHRM 0 CT 27IN 914H

## (undated) DEVICE — SUT GUT CHRM 3/0 CT1 3/0 36IN 922H

## (undated) DEVICE — GLV SURG BIOGEL LTX PF 6 1/2

## (undated) DEVICE — NDL BLNT 18G 1 1/2IN

## (undated) DEVICE — 3M(TM) TEGADERM(TM) TRANSPARENT FILM DRESSING FRAME STYLE 1627: Brand: 3M™ TEGADERM™

## (undated) DEVICE — ADHS SKIN DERMABOND TOPICAL HI VISC

## (undated) DEVICE — SUT MNCRYL PLS ANTIB UD 4/0 PS2 18IN

## (undated) DEVICE — KT ART BLD GAS QUICK DRAW

## (undated) DEVICE — SOL IRR H2O BTL 1000ML STRL